# Patient Record
Sex: MALE | Race: WHITE | URBAN - METROPOLITAN AREA
[De-identification: names, ages, dates, MRNs, and addresses within clinical notes are randomized per-mention and may not be internally consistent; named-entity substitution may affect disease eponyms.]

---

## 2023-05-22 ENCOUNTER — OFFICE VISIT (OUTPATIENT)
Dept: NEUROLOGY | Age: 68
End: 2023-05-22

## 2023-05-22 ENCOUNTER — OFFICE VISIT (OUTPATIENT)
Age: 68
End: 2023-05-22
Payer: MEDICARE

## 2023-05-22 VITALS
DIASTOLIC BLOOD PRESSURE: 70 MMHG | BODY MASS INDEX: 28.14 KG/M2 | HEART RATE: 83 BPM | WEIGHT: 201 LBS | OXYGEN SATURATION: 99 % | HEIGHT: 71 IN | TEMPERATURE: 97.1 F | SYSTOLIC BLOOD PRESSURE: 120 MMHG

## 2023-05-22 DIAGNOSIS — R41.89 COGNITIVE IMPAIRMENT: Primary | ICD-10-CM

## 2023-05-22 DIAGNOSIS — R68.89 OTHER GENERAL SYMPTOMS AND SIGNS: ICD-10-CM

## 2023-05-22 DIAGNOSIS — I63.9 CEREBROVASCULAR ACCIDENT (CVA), UNSPECIFIED MECHANISM (HCC): ICD-10-CM

## 2023-05-22 DIAGNOSIS — R41.89 COGNITIVE IMPAIRMENT: ICD-10-CM

## 2023-05-22 PROCEDURE — 99205 OFFICE O/P NEW HI 60 MIN: CPT | Performed by: PSYCHIATRY & NEUROLOGY

## 2023-05-22 PROCEDURE — G8419 CALC BMI OUT NRM PARAM NOF/U: HCPCS | Performed by: PSYCHIATRY & NEUROLOGY

## 2023-05-22 PROCEDURE — G8428 CUR MEDS NOT DOCUMENT: HCPCS | Performed by: PSYCHIATRY & NEUROLOGY

## 2023-05-22 RX ORDER — TAMSULOSIN HYDROCHLORIDE 0.4 MG/1
CAPSULE ORAL
COMMUNITY
Start: 2022-07-01

## 2023-05-22 RX ORDER — DEXAMETHASONE SODIUM PHOSPHATE 1 MG/ML
SOLUTION/ DROPS OPHTHALMIC
COMMUNITY
Start: 2023-04-25

## 2023-05-22 RX ORDER — TOPIRAMATE 25 MG/1
25 TABLET ORAL DAILY
COMMUNITY
Start: 2023-05-15

## 2023-05-22 RX ORDER — CETIRIZINE HYDROCHLORIDE 10 MG/1
TABLET ORAL
COMMUNITY
Start: 2023-03-27

## 2023-05-22 RX ORDER — LISINOPRIL 10 MG/1
TABLET ORAL
COMMUNITY
Start: 2023-05-11

## 2023-05-22 RX ORDER — ATORVASTATIN CALCIUM 40 MG/1
40 TABLET, FILM COATED ORAL DAILY
COMMUNITY
Start: 2023-05-18

## 2023-05-22 RX ORDER — UMECLIDINIUM BROMIDE AND VILANTEROL TRIFENATATE 62.5; 25 UG/1; UG/1
POWDER RESPIRATORY (INHALATION)
COMMUNITY

## 2023-05-22 RX ORDER — ASPIRIN 81 MG/1
TABLET ORAL EVERY 24 HOURS
COMMUNITY

## 2023-05-22 RX ORDER — AMLODIPINE BESYLATE 5 MG/1
TABLET ORAL
COMMUNITY

## 2023-05-22 RX ORDER — FLUTICASONE PROPIONATE 50 MCG
SPRAY, SUSPENSION (ML) NASAL
COMMUNITY

## 2023-05-22 RX ORDER — PREDNISOLONE ACETATE 10 MG/ML
1 SUSPENSION/ DROPS OPHTHALMIC DAILY
COMMUNITY

## 2023-05-22 RX ORDER — OMEPRAZOLE 20 MG/1
CAPSULE, DELAYED RELEASE ORAL
COMMUNITY

## 2023-05-22 NOTE — PROGRESS NOTES
NEUROLOGY NEW PATIENT CONSULTATION      5/22/2023    RE: Los Florentino    REFERRED BY:  GENARO Orozco NP    CHIEF COMPLAINT:  This is Los Nephew is a 69 y.o. male who had concerns including Neurologic Problem. HPI:     In 2012, patient had a basal ganglia stroke seen at [facility] neurology. (Memory and aging care clinic). Not placed on meds. Since then, patient noted to have cognitive impairment. He had neuropsychological testing done in 12/2012 at [facility] with results demonstrating intact learning and memory, language, complex attention, and visuopatial abilities. His deficits were felt to be most consistent with his prior stroke, particularly his variable performance in attention and executive functioning as well as his reported amotivation, apathy, and blunted affect. He reports that his symptoms improved somewhat on initiating treatment with Adderall but this effect has diminished. Patient continues to be withdrawn. Has problem expressing himself. (-) hallucinations  (-) depression  Sleeping okay    Takes ASA with no new strokes. Review of Systems  (-) fever  (-) rash  All other systems reviewed and are negative    PMH  No past medical history on file. Stroke 2012    Social Hx  Social History     Socioeconomic History    Marital status:    (+) smoking - 1 PPD  (-) alcohol    Family Hx  No family history on file.   Mother - dementia    ALLERGIES  No Known Allergies    CURRENT MEDS  Current Outpatient Medications   Medication Sig Dispense Refill    topiramate (TOPAMAX) 25 MG tablet Take 1 tablet by mouth daily      tamsulosin (FLOMAX) 0.4 MG capsule as directed Orally at bedtime for 90 days      amLODIPine (NORVASC) 5 MG tablet TAKE 1 TABLET BY MOUTH EVERY DAY FOR 90 DAYS for 90 days      Ascorbic Acid 500 MG CHEW Take 500 mg by mouth daily      prednisoLONE acetate (PRED FORTE) 1 % ophthalmic suspension 1 drop daily      omeprazole (PRILOSEC) 20 MG delayed release capsule TAKE ONE CAPSULE

## 2023-05-24 ENCOUNTER — TELEPHONE (OUTPATIENT)
Age: 68
End: 2023-05-24

## 2023-05-25 LAB
ANA SER QL: POSITIVE
CENTROMERE B AB SER-ACNC: <0.2 AI (ref 0–0.9)
CHROMATIN AB SERPL-ACNC: <0.2 AI (ref 0–0.9)
DSDNA AB SER-ACNC: <1 IU/ML (ref 0–9)
ENA JO1 AB SER-ACNC: <0.2 AI (ref 0–0.9)
ENA RNP AB SER-ACNC: 4.4 AI (ref 0–0.9)
ENA SCL70 AB SER-ACNC: <0.2 AI (ref 0–0.9)
ENA SM AB SER-ACNC: <0.2 AI (ref 0–0.9)
ENA SS-A AB SER-ACNC: <0.2 AI (ref 0–0.9)
ENA SS-B AB SER-ACNC: <0.2 AI (ref 0–0.9)
ERYTHROCYTE [SEDIMENTATION RATE] IN BLOOD BY WESTERGREN METHOD: 2 MM/HR (ref 0–30)
FOLATE SERPL-MCNC: >20 NG/ML
Lab: ABNORMAL
TSH SERPL DL<=0.005 MIU/L-ACNC: 0.79 UIU/ML (ref 0.45–4.5)
VIT B12 SERPL-MCNC: 588 PG/ML (ref 232–1245)

## 2023-05-26 ENCOUNTER — TELEPHONE (OUTPATIENT)
Age: 68
End: 2023-05-26

## 2023-05-26 NOTE — TELEPHONE ENCOUNTER
I called the pt and spoke to his wife mrs mar, and I informed her that we received a referral for her  to come into our clinic to see our doctor, but I had to inform her that our doctor's panel is closed and that he is not taking on any new pt. I advised her to let her  reach our to the referring doctor to find him another doctor that he can see.  5/26/23

## 2023-05-30 ENCOUNTER — TELEPHONE (OUTPATIENT)
Age: 68
End: 2023-05-30

## 2023-05-30 NOTE — TELEPHONE ENCOUNTER
Kev Petty. Hipaa verified. Inform her that the pt  RED and RNP labs are postive and that Dr. Hamilton Morgan referred pt to Rheumatology and that they will contact pt regarding appointment. Yolanda Garza understanding and states that someone has contacted pt regarding appt but does not accept his insurance. Inform Leslye Hamman to have pt to contact his insurance company to see which Rheumatologist is in network. Also inform her that I will mail out the referral to the addressed on file. Yolanda Garza understanding.

## 2023-05-30 NOTE — TELEPHONE ENCOUNTER
----- Message from Dejon Mascorro MD sent at 5/25/2023  1:33 PM EDT -----  Inform patient (+) RED and RNP    P> Refer to rheumatology (referral ordered)      ----- Message -----  From: Meenu Chavarria Incoming Amb Ref Lab Orders To Denisha Goods: 5/25/2023   6:38 AM EDT  To: Dejon Mascorro MD

## 2023-06-01 ENCOUNTER — HOSPITAL ENCOUNTER (OUTPATIENT)
Age: 68
Discharge: HOME | End: 2023-06-01
Payer: MEDICARE

## 2023-06-01 DIAGNOSIS — R41.89 COGNITIVE IMPAIRMENT: ICD-10-CM

## 2023-06-01 PROCEDURE — 70551 MRI BRAIN STEM W/O DYE: CPT

## 2023-07-03 ENCOUNTER — OFFICE VISIT (OUTPATIENT)
Age: 68
End: 2023-07-03
Payer: MEDICARE

## 2023-07-03 DIAGNOSIS — R41.3 SHORT-TERM MEMORY LOSS: ICD-10-CM

## 2023-07-03 DIAGNOSIS — Z86.73 HISTORY OF ARTERIAL ISCHEMIC STROKE: ICD-10-CM

## 2023-07-03 DIAGNOSIS — F32.1 MODERATE MAJOR DEPRESSION (HCC): ICD-10-CM

## 2023-07-03 DIAGNOSIS — G31.84 MILD COGNITIVE IMPAIRMENT: Primary | ICD-10-CM

## 2023-07-03 DIAGNOSIS — R41.89 COGNITIVE DECLINE: ICD-10-CM

## 2023-07-03 DIAGNOSIS — R45.3 APATHY: ICD-10-CM

## 2023-07-03 PROCEDURE — 90785 PSYTX COMPLEX INTERACTIVE: CPT | Performed by: CLINICAL NEUROPSYCHOLOGIST

## 2023-07-03 PROCEDURE — 90791 PSYCH DIAGNOSTIC EVALUATION: CPT | Performed by: CLINICAL NEUROPSYCHOLOGIST

## 2023-07-03 PROCEDURE — 4004F PT TOBACCO SCREEN RCVD TLK: CPT | Performed by: CLINICAL NEUROPSYCHOLOGIST

## 2023-07-03 PROCEDURE — 1123F ACP DISCUSS/DSCN MKR DOCD: CPT | Performed by: CLINICAL NEUROPSYCHOLOGIST

## 2023-07-03 NOTE — PROGRESS NOTES
[address] [facility]  [address] [address] [facility], [address] [place] [address]   [phone] Office   [phone] Fax      Neuropsychology    Initial Diagnostic Interview Note      Referral:  Talmage Krabbe, APRN - NP    Veronica Arroyo is a 69 y.o. right handed   male who was accompanied by his spouse to the initial clinical interview on 7/3/23 . Please refer to his medical records for details pertaining to his history. At the start of the appointment, I reviewed the patient's [facility] Epic Chart (including Media scanned in from previous providers) for the active Problem List, all pertinent Past Medical Hx, medications, recent radiologic and laboratory findings. In addition, I reviewed pt's documented Immunization Record and Encounter History. In 2012, patient had a basal ganglia stroke seen at [facility] neurology. (Memory and aging care clinic). Not placed on meds. Since then, patient noted to have progressive neurocognitive decline. When he did testing in 2012 at [facility] they found normal range learning, memory, language, attention, visuospatial skills. There was apathy and blunted affect. He was put on adderall. Simple attention problems had been found. There is progressive decline. He has word finding problems. No hallucinations/psychosis. No depression. Sleep and appetite are status quo. Takes ASA with no new stroke, meningitis/encephalitis, ILENE Fever, Lupus, Lyme, TBI, sz. Since then, there have been progressive changes. Spouse provides a lot of the history, as patient is confused today and struggles with repetition. After stroke he was out of work 10 months. Then went back to work and did well. He then retired in 2014 (not because of memory issues). He has been getting worse. He doesn't want to go anywhere. Do anything. Hard time coming up with words and such. He drives, but not far.   He can take his

## 2023-07-14 ENCOUNTER — TELEPHONE (OUTPATIENT)
Age: 68
End: 2023-07-14

## 2023-07-17 ENCOUNTER — PROCEDURE VISIT (OUTPATIENT)
Age: 68
End: 2023-07-17
Payer: MEDICARE

## 2023-07-17 DIAGNOSIS — F32.1 MODERATE MAJOR DEPRESSION (HCC): ICD-10-CM

## 2023-07-17 DIAGNOSIS — I69.398 DEPRESSION DUE TO OLD STROKE: ICD-10-CM

## 2023-07-17 DIAGNOSIS — F41.1 GENERALIZED ANXIETY DISORDER: ICD-10-CM

## 2023-07-17 DIAGNOSIS — F01.A0 MILD VASCULAR DEMENTIA WITHOUT BEHAVIORAL DISTURBANCE, PSYCHOTIC DISTURBANCE, MOOD DISTURBANCE, OR ANXIETY (HCC): Primary | ICD-10-CM

## 2023-07-17 DIAGNOSIS — F06.31 DEPRESSION DUE TO OLD STROKE: ICD-10-CM

## 2023-07-17 PROCEDURE — 96136 PSYCL/NRPSYC TST PHY/QHP 1ST: CPT | Performed by: CLINICAL NEUROPSYCHOLOGIST

## 2023-07-17 PROCEDURE — 96137 PSYCL/NRPSYC TST PHY/QHP EA: CPT | Performed by: CLINICAL NEUROPSYCHOLOGIST

## 2023-07-17 PROCEDURE — 96132 NRPSYC TST EVAL PHYS/QHP 1ST: CPT | Performed by: CLINICAL NEUROPSYCHOLOGIST

## 2023-07-17 PROCEDURE — 96133 NRPSYC TST EVAL PHYS/QHP EA: CPT | Performed by: CLINICAL NEUROPSYCHOLOGIST

## 2023-07-17 PROCEDURE — 96138 PSYCL/NRPSYC TECH 1ST: CPT | Performed by: CLINICAL NEUROPSYCHOLOGIST

## 2023-07-17 PROCEDURE — 96139 PSYCL/NRPSYC TST TECH EA: CPT | Performed by: CLINICAL NEUROPSYCHOLOGIST

## 2023-07-20 NOTE — PROGRESS NOTES
[address] [facility]  [address] [address] [facility], [address] [place] [address]   [phone] Office   [phone] Fax      Neuropsychological Evaluation Report    Referral:  GENARO Roland NP    Mary Beaver is a 69 y.o. right handed   male who was accompanied by his spouse to the initial clinical interview on 7/3/23 . Please refer to his medical records for details pertaining to his history. At the start of the appointment, I reviewed the patient's [facility] Epic Chart (including Media scanned in from previous providers) for the active Problem List, all pertinent Past Medical Hx, medications, recent radiologic and laboratory findings. In addition, I reviewed pt's documented Immunization Record and Encounter History. In 2012, patient had a basal ganglia stroke seen at [facility] neurology. (Memory and aging care clinic). Not placed on meds. Since then, patient noted to have progressive neurocognitive decline. When he did testing in 2012 at [facility] they found normal range learning, memory, language, attention, visuospatial skills. There was apathy and blunted affect. He was put on adderall. Simple attention problems had been found. There is progressive decline. He has word finding problems. No hallucinations/psychosis. No depression. Sleep and appetite are status quo. Takes ASA with no new stroke, meningitis/encephalitis, ILENE Fever, Lupus, Lyme, TBI, sz. Since then, there have been progressive changes. Spouse provides a lot of the history, as patient is confused today and struggles with repetition. After stroke he was out of work 10 months. Then went back to work and did well. He then retired in 2014 (not because of memory issues). He has been getting worse. He doesn't want to go anywhere. Do anything. Hard time coming up with words and such. He drives, but not far. He can take his medications.   She

## 2023-10-10 ENCOUNTER — OFFICE VISIT (OUTPATIENT)
Age: 68
End: 2023-10-10
Payer: MEDICARE

## 2023-10-10 DIAGNOSIS — I69.398 DEPRESSION DUE TO OLD STROKE: ICD-10-CM

## 2023-10-10 DIAGNOSIS — R41.3 SHORT-TERM MEMORY LOSS: ICD-10-CM

## 2023-10-10 DIAGNOSIS — F41.1 GENERALIZED ANXIETY DISORDER: ICD-10-CM

## 2023-10-10 DIAGNOSIS — F06.31 DEPRESSION DUE TO OLD STROKE: ICD-10-CM

## 2023-10-10 DIAGNOSIS — F01.A0 MILD VASCULAR DEMENTIA WITHOUT BEHAVIORAL DISTURBANCE, PSYCHOTIC DISTURBANCE, MOOD DISTURBANCE, OR ANXIETY (HCC): Primary | ICD-10-CM

## 2023-10-10 DIAGNOSIS — F32.1 MODERATE MAJOR DEPRESSION (HCC): ICD-10-CM

## 2023-10-10 DIAGNOSIS — R41.89 COGNITIVE DECLINE: ICD-10-CM

## 2023-10-10 DIAGNOSIS — Z86.73 HISTORY OF ARTERIAL ISCHEMIC STROKE: ICD-10-CM

## 2023-10-10 PROCEDURE — 90785 PSYTX COMPLEX INTERACTIVE: CPT | Performed by: CLINICAL NEUROPSYCHOLOGIST

## 2023-10-10 PROCEDURE — 4004F PT TOBACCO SCREEN RCVD TLK: CPT | Performed by: CLINICAL NEUROPSYCHOLOGIST

## 2023-10-10 PROCEDURE — 1123F ACP DISCUSS/DSCN MKR DOCD: CPT | Performed by: CLINICAL NEUROPSYCHOLOGIST

## 2023-10-10 PROCEDURE — 90832 PSYTX W PT 30 MINUTES: CPT | Performed by: CLINICAL NEUROPSYCHOLOGIST

## 2023-10-10 NOTE — PROGRESS NOTES
Prior to seeing the patient I reviewed the records, including the previously completed report, the records in [place], and any updated visits from other providers since I saw the patient last.      Today, I engaged in a psychoeducational and supportive and cognitive/behavioral psychotherapy session with the patient. I provided psychotherapy in the form of psychoeducation and support with respect to the results of the recent Neuropsychological Evaluation, including discussing individual tests as well as patient's areas of neurocognitive strength versus weakness. We discussed, in detail, the following: This patient generated a a mixed normal/abnormal range Neuropsychological Evaluation with respect to neurocognitive functioning. In this regard, he is showing problems with sustained attention (moderate to severe) verbal fluency (mild), nondominant handed fine motor dexterity (mild to moderate) and processing speed (borderline). Otherwise, his performances across all other neurocognitive veins assessed are normal.  From an emotional standpoint, the patient was highly defensive in his response style on personality testing but there is clear evidence of significant depression and anxiety symptoms. After her stroke, the patient had neuropsych testing which showed attention deficit issues. Here, we are continuing to see marked attention deficit problems but also seeing some verbal fluency and motor skills issues. This suggests a decline in functioning over time which would be consistent with a vascular type dementia consideration. At the same time, though, his memory scores are normal, which is reassuring. A contributing factor here is significant depression and anxiety, some of which appears to be stroke related. These issues serve to further enhance underlying neurocognitive deficits.                  In addition to continued medical care, recommendations include psychiatric treatment

## 2023-11-13 NOTE — PROGRESS NOTES
Neurology Progress Note    Patient ID:  Elma Worrell  [id number]  58 y.o.  1955      Subjective:   History:  Elma Worrell is a 69 y.o. male smoker who in 2012, patient had a basal ganglia stroke seen at [facility] neurology. (Memory and aging care clinic). Not placed on meds. Since then, patient noted to have cognitive impairment. He had neuropsychological testing done in 12/2012 at [facility] with results demonstrating intact learning and memory, language, complex attention, and visuopatial abilities. His deficits were felt to be most consistent with his prior stroke, particularly his variable performance in attention and executive functioning as well as his reported amotivation, apathy, and blunted affect. He reports that his symptoms improved somewhat on initiating treatment with Adderall but this effect has diminished. Patient's RED and RNP were both positive. [facility] \"[facility] was okay. \"          Neuropsychological testing (7/17/23)  This patient generated a a mixed normal/abnormal range Neuropsychological Evaluation with respect to neurocognitive functioning. In this regard, he is showing problems with sustained attention (moderate to severe) verbal fluency (mild), nondominant handed fine motor dexterity (mild to moderate) and processing speed (borderline). Otherwise, his performances across all other neurocognitive veins assessed are normal.  From an emotional standpoint, the patient was highly defensive in his response style on personality testing but there is clear evidence of significant depression and anxiety symptoms. After her stroke, the patient had neuropsych testing which showed attention deficit issues. Here, we are continuing to see marked attention deficit problems but also seeing some verbal fluency and motor skills issues.   This suggests a decline in functioning over time which would be consistent with a vascular type dementia

## 2023-11-20 ENCOUNTER — OFFICE VISIT (OUTPATIENT)
Age: 68
End: 2023-11-20
Payer: MEDICARE

## 2023-11-20 VITALS
BODY MASS INDEX: 27.89 KG/M2 | DIASTOLIC BLOOD PRESSURE: 70 MMHG | TEMPERATURE: 97 F | HEART RATE: 92 BPM | SYSTOLIC BLOOD PRESSURE: 120 MMHG | HEIGHT: 71 IN | OXYGEN SATURATION: 97 %

## 2023-11-20 DIAGNOSIS — R41.89 COGNITIVE IMPAIRMENT: Primary | ICD-10-CM

## 2023-11-20 DIAGNOSIS — F01.A0 MILD VASCULAR DEMENTIA, UNSPECIFIED WHETHER BEHAVIORAL, PSYCHOTIC, OR MOOD DISTURBANCE OR ANXIETY (HCC): ICD-10-CM

## 2023-11-20 PROCEDURE — G8484 FLU IMMUNIZE NO ADMIN: HCPCS | Performed by: PSYCHIATRY & NEUROLOGY

## 2023-11-20 PROCEDURE — 3017F COLORECTAL CA SCREEN DOC REV: CPT | Performed by: PSYCHIATRY & NEUROLOGY

## 2023-11-20 PROCEDURE — G8427 DOCREV CUR MEDS BY ELIG CLIN: HCPCS | Performed by: PSYCHIATRY & NEUROLOGY

## 2023-11-20 PROCEDURE — G8419 CALC BMI OUT NRM PARAM NOF/U: HCPCS | Performed by: PSYCHIATRY & NEUROLOGY

## 2023-11-20 PROCEDURE — 99215 OFFICE O/P EST HI 40 MIN: CPT | Performed by: PSYCHIATRY & NEUROLOGY

## 2023-11-20 PROCEDURE — 1123F ACP DISCUSS/DSCN MKR DOCD: CPT | Performed by: PSYCHIATRY & NEUROLOGY

## 2023-11-20 PROCEDURE — 4004F PT TOBACCO SCREEN RCVD TLK: CPT | Performed by: PSYCHIATRY & NEUROLOGY

## 2023-11-20 RX ORDER — DONEPEZIL HYDROCHLORIDE 5 MG/1
5 TABLET, FILM COATED ORAL DAILY
Qty: 30 TABLET | Refills: 5 | Status: SHIPPED | OUTPATIENT
Start: 2023-11-20

## 2023-12-01 ENCOUNTER — HOSPITAL ENCOUNTER (OUTPATIENT)
Facility: HOSPITAL | Age: 68
End: 2023-12-01
Attending: PSYCHIATRY & NEUROLOGY
Payer: MEDICARE

## 2023-12-01 DIAGNOSIS — R41.89 COGNITIVE IMPAIRMENT: ICD-10-CM

## 2023-12-01 LAB — CREAT BLD-MCNC: 1.6 MG/DL (ref 0.6–1.3)

## 2023-12-01 PROCEDURE — 6360000004 HC RX CONTRAST MEDICATION: Performed by: PSYCHIATRY & NEUROLOGY

## 2023-12-01 PROCEDURE — 82565 ASSAY OF CREATININE: CPT

## 2023-12-01 PROCEDURE — 70496 CT ANGIOGRAPHY HEAD: CPT

## 2023-12-01 RX ADMIN — IOPAMIDOL 100 ML: 755 INJECTION, SOLUTION INTRAVENOUS at 15:01

## 2024-02-23 NOTE — PROGRESS NOTES
Neurology Progress Note    Patient ID:  Ketan Salas  [id number]  68 y.o.  1955      Subjective:   History:  Ketan Salas is a male smoker who in 2012, patient had a basal ganglia stroke seen at [facility] neurology. (Memory and aging care clinic). Not placed on meds. Since then, patient noted to have cognitive impairment. He had neuropsychological testing done in 12/2012 at [facility] with results demonstrating intact learning and memory, language, complex attention, and visuopatial abilities. His deficits were felt to be most consistent with his prior stroke, particularly his variable performance in attention and executive functioning as well as his reported amotivation, apathy, and blunted affect. He reports that his symptoms improved somewhat on initiating treatment with Adderall but this effect has diminished. Neuropsychological testing (7/17/23)Dementia -Mild, Vascular (decline since stroke, primarily attention problems); Depression with Anxiety - At Least Moderate, Post Stroke, With Apathy and Low Motivation Patient's RED and RNP were both positive. Patient saw a rheumatologist at [facility] but said \"everything was okay.\"    Patient was started on Aricept 5 mg QHS with no clear benefit.    Having a lot of attention issue. Not on Adderall which worked in the past.    On ASA 81 with no new event    Still smokes more than 1/2 PPD.           ROS:  Per HPI-  Otherwise the remainder of ROS was negative    Social Hx  Social History     Socioeconomic History    Marital status:        Meds:  Current Outpatient Medications on File Prior to Visit   Medication Sig Dispense Refill    donepezil (ARICEPT) 5 MG tablet Take 1 tablet by mouth daily 30 tablet 5    topiramate (TOPAMAX) 25 MG tablet Take 1 tablet by mouth daily      tamsulosin (FLOMAX) 0.4 MG capsule as directed Orally at bedtime for 90 days      Ascorbic Acid 500 MG CHEW Take 500 mg by mouth daily      prednisoLONE acetate (PRED FORTE) 1 % ophthalmic suspension 1 drop

## 2024-02-26 ENCOUNTER — OFFICE VISIT (OUTPATIENT)
Age: 69
End: 2024-02-26
Payer: MEDICARE

## 2024-02-26 VITALS
DIASTOLIC BLOOD PRESSURE: 70 MMHG | HEIGHT: 71 IN | BODY MASS INDEX: 27.89 KG/M2 | TEMPERATURE: 97 F | SYSTOLIC BLOOD PRESSURE: 120 MMHG | OXYGEN SATURATION: 97 % | HEART RATE: 82 BPM

## 2024-02-26 DIAGNOSIS — I69.30 SEQUELA, POST-STROKE: ICD-10-CM

## 2024-02-26 DIAGNOSIS — F90.9 ATTENTION DEFICIT HYPERACTIVITY DISORDER (ADHD), UNSPECIFIED ADHD TYPE: Primary | ICD-10-CM

## 2024-02-26 DIAGNOSIS — R41.840 ATTENTION DEFICIT: ICD-10-CM

## 2024-02-26 PROCEDURE — 99215 OFFICE O/P EST HI 40 MIN: CPT | Performed by: PSYCHIATRY & NEUROLOGY

## 2024-02-26 PROCEDURE — 1123F ACP DISCUSS/DSCN MKR DOCD: CPT | Performed by: PSYCHIATRY & NEUROLOGY

## 2024-02-26 PROCEDURE — G8484 FLU IMMUNIZE NO ADMIN: HCPCS | Performed by: PSYCHIATRY & NEUROLOGY

## 2024-02-26 PROCEDURE — 4004F PT TOBACCO SCREEN RCVD TLK: CPT | Performed by: PSYCHIATRY & NEUROLOGY

## 2024-02-26 PROCEDURE — 3017F COLORECTAL CA SCREEN DOC REV: CPT | Performed by: PSYCHIATRY & NEUROLOGY

## 2024-02-26 PROCEDURE — G8419 CALC BMI OUT NRM PARAM NOF/U: HCPCS | Performed by: PSYCHIATRY & NEUROLOGY

## 2024-02-26 PROCEDURE — G8428 CUR MEDS NOT DOCUMENT: HCPCS | Performed by: PSYCHIATRY & NEUROLOGY

## 2024-02-26 RX ORDER — DEXTROAMPHETAMINE SACCHARATE, AMPHETAMINE ASPARTATE, DEXTROAMPHETAMINE SULFATE AND AMPHETAMINE SULFATE 2.5; 2.5; 2.5; 2.5 MG/1; MG/1; MG/1; MG/1
10 TABLET ORAL DAILY
Qty: 30 TABLET | Refills: 0 | Status: SHIPPED | OUTPATIENT
Start: 2024-02-26 | End: 2024-02-27 | Stop reason: SDUPTHER

## 2024-02-27 ENCOUNTER — TELEPHONE (OUTPATIENT)
Age: 69
End: 2024-02-27

## 2024-02-27 DIAGNOSIS — R41.840 ATTENTION DEFICIT: ICD-10-CM

## 2024-02-27 NOTE — TELEPHONE ENCOUNTER
Called pt. No answer left message on VM stating that Dr. Fish received a alternative requested from [facility] pharmacy regarding adderall 10mg to send to another pharmacy. Pt does not have another pharmacy on file and Dr. Fish would like to know what pharmacy pt would like this medication sent to.

## 2024-02-28 RX ORDER — DEXTROAMPHETAMINE SACCHARATE, AMPHETAMINE ASPARTATE, DEXTROAMPHETAMINE SULFATE AND AMPHETAMINE SULFATE 2.5; 2.5; 2.5; 2.5 MG/1; MG/1; MG/1; MG/1
10 TABLET ORAL DAILY
Qty: 30 TABLET | Refills: 0 | Status: SHIPPED | OUTPATIENT
Start: 2024-02-28 | End: 2024-03-29

## 2024-03-27 DIAGNOSIS — R41.840 ATTENTION DEFICIT: ICD-10-CM

## 2024-04-03 RX ORDER — DEXTROAMPHETAMINE SACCHARATE, AMPHETAMINE ASPARTATE, DEXTROAMPHETAMINE SULFATE AND AMPHETAMINE SULFATE 2.5; 2.5; 2.5; 2.5 MG/1; MG/1; MG/1; MG/1
10 TABLET ORAL DAILY
Qty: 30 TABLET | Refills: 0 | Status: SHIPPED | OUTPATIENT
Start: 2024-04-03 | End: 2024-05-03

## 2024-05-07 DIAGNOSIS — R41.840 ATTENTION DEFICIT: ICD-10-CM

## 2024-05-08 RX ORDER — DEXTROAMPHETAMINE SACCHARATE, AMPHETAMINE ASPARTATE, DEXTROAMPHETAMINE SULFATE AND AMPHETAMINE SULFATE 2.5; 2.5; 2.5; 2.5 MG/1; MG/1; MG/1; MG/1
10 TABLET ORAL DAILY
Qty: 30 TABLET | Refills: 0 | Status: SHIPPED | OUTPATIENT
Start: 2024-05-08 | End: 2024-06-07

## 2024-05-31 NOTE — PROGRESS NOTES
Stress the need to quit smoking. Patient is reluctant        Return in about 3 months (around 9/3/2024).           Dewey Fish MD  Diplomate, American Board of Psychiatry and Neurology  Diplomate, Neuromuscular Medicine  Diplomate, American Board of Electrodiagnostic Medicine

## 2024-06-03 ENCOUNTER — OFFICE VISIT (OUTPATIENT)
Age: 69
End: 2024-06-03
Payer: MEDICARE

## 2024-06-03 VITALS
DIASTOLIC BLOOD PRESSURE: 80 MMHG | WEIGHT: 200 LBS | SYSTOLIC BLOOD PRESSURE: 138 MMHG | HEIGHT: 71 IN | HEART RATE: 80 BPM | OXYGEN SATURATION: 97 % | BODY MASS INDEX: 28 KG/M2 | RESPIRATION RATE: 14 BRPM

## 2024-06-03 DIAGNOSIS — F01.A3 MILD VASCULAR DEMENTIA WITH MOOD DISTURBANCE (HCC): ICD-10-CM

## 2024-06-03 DIAGNOSIS — R41.840 ATTENTION DEFICIT: Primary | ICD-10-CM

## 2024-06-03 PROCEDURE — G8427 DOCREV CUR MEDS BY ELIG CLIN: HCPCS | Performed by: PSYCHIATRY & NEUROLOGY

## 2024-06-03 PROCEDURE — 1123F ACP DISCUSS/DSCN MKR DOCD: CPT | Performed by: PSYCHIATRY & NEUROLOGY

## 2024-06-03 PROCEDURE — 4004F PT TOBACCO SCREEN RCVD TLK: CPT | Performed by: PSYCHIATRY & NEUROLOGY

## 2024-06-03 PROCEDURE — G8419 CALC BMI OUT NRM PARAM NOF/U: HCPCS | Performed by: PSYCHIATRY & NEUROLOGY

## 2024-06-03 PROCEDURE — 3017F COLORECTAL CA SCREEN DOC REV: CPT | Performed by: PSYCHIATRY & NEUROLOGY

## 2024-06-03 PROCEDURE — 99214 OFFICE O/P EST MOD 30 MIN: CPT | Performed by: PSYCHIATRY & NEUROLOGY

## 2024-06-03 RX ORDER — DEXTROAMPHETAMINE SACCHARATE, AMPHETAMINE ASPARTATE, DEXTROAMPHETAMINE SULFATE AND AMPHETAMINE SULFATE 5; 5; 5; 5 MG/1; MG/1; MG/1; MG/1
20 TABLET ORAL DAILY
Qty: 30 TABLET | Refills: 0 | Status: SHIPPED | OUTPATIENT
Start: 2024-06-03 | End: 2024-07-03

## 2024-06-03 RX ORDER — ACYCLOVIR 800 MG/1
800 TABLET ORAL
COMMUNITY
Start: 2023-07-27

## 2024-06-03 RX ORDER — DEXTROAMPHETAMINE SACCHARATE, AMPHETAMINE ASPARTATE, DEXTROAMPHETAMINE SULFATE AND AMPHETAMINE SULFATE 2.5; 2.5; 2.5; 2.5 MG/1; MG/1; MG/1; MG/1
10 TABLET ORAL DAILY
Qty: 30 TABLET | Refills: 2 | Status: CANCELLED | OUTPATIENT
Start: 2024-06-03 | End: 2024-07-03

## 2024-06-26 DIAGNOSIS — R41.840 ATTENTION DEFICIT: ICD-10-CM

## 2024-06-26 RX ORDER — DEXTROAMPHETAMINE SACCHARATE, AMPHETAMINE ASPARTATE, DEXTROAMPHETAMINE SULFATE AND AMPHETAMINE SULFATE 5; 5; 5; 5 MG/1; MG/1; MG/1; MG/1
20 TABLET ORAL DAILY
Qty: 30 TABLET | Refills: 0 | Status: SHIPPED | OUTPATIENT
Start: 2024-06-26 | End: 2024-07-26

## 2024-07-16 ENCOUNTER — TELEPHONE (OUTPATIENT)
Age: 69
End: 2024-07-16

## 2024-07-17 ENCOUNTER — OFFICE VISIT (OUTPATIENT)
Age: 69
End: 2024-07-17
Payer: MEDICARE

## 2024-07-17 DIAGNOSIS — F01.A3 MILD VASCULAR DEMENTIA WITH MOOD DISTURBANCE (HCC): Primary | ICD-10-CM

## 2024-07-17 PROCEDURE — 90791 PSYCH DIAGNOSTIC EVALUATION: CPT | Performed by: CLINICAL NEUROPSYCHOLOGIST

## 2024-07-17 PROCEDURE — 1123F ACP DISCUSS/DSCN MKR DOCD: CPT | Performed by: CLINICAL NEUROPSYCHOLOGIST

## 2024-07-17 PROCEDURE — 4004F PT TOBACCO SCREEN RCVD TLK: CPT | Performed by: CLINICAL NEUROPSYCHOLOGIST

## 2024-07-17 PROCEDURE — 90785 PSYTX COMPLEX INTERACTIVE: CPT | Performed by: CLINICAL NEUROPSYCHOLOGIST

## 2024-07-17 NOTE — PROGRESS NOTES
[facility] NEUROSCIENCE [facility]/EMERGENCY CENTER  NEUROLOGY CLINIC   [address]   [place] [address]   [phone] Office   [phone] Fax      Neuropsychology    Exam # 2    Initial Diagnostic Interview Note      Referral:  Nagi Welch, GENARO Scherer NP    Ketan Salas is a 68 y.o. right handed   male who was accompanied to the initial clinical interview on 7/17/2024 .  Please refer to his medical records for details pertaining to his history.   At the start of the appointment, I reviewed the patient's [facility] Epic Chart (including Media scanned in from previous providers) for the active Problem List, all pertinent Past Medical Hx, medications, recent radiologic and laboratory findings.  In addition, I reviewed pt's documented Immunization Record and Encounter History.     When I saw him last:    Ketan Salas is a 67 y.o. right handed   male who was accompanied by his spouse to the initial clinical interview on 7/3/23 .  Please refer to his medical records for details pertaining to his history.   At the start of the appointment, I reviewed the patient's [facility] Epic Chart (including Media scanned in from previous providers) for the active Problem List, all pertinent Past Medical Hx, medications, recent radiologic and laboratory findings.  In addition, I reviewed pt's documented Immunization Record and Encounter History.     In 2012, patient had a basal ganglia stroke seen at [facility] neurology. (Memory and aging care clinic). Not placed on meds. Since then, patient noted to have progressive neurocognitive decline. When he did testing in 2012 at [facility] they found normal range learning, memory, language, attention, visuospatial skills.  There was apathy and blunted affect.  He was put on adderall.  Simple attention problems had been found. There is progressive decline. He has word finding problems.  No hallucinations/psychosis. No

## 2024-07-23 ENCOUNTER — PROCEDURE VISIT (OUTPATIENT)
Age: 69
End: 2024-07-23

## 2024-07-23 DIAGNOSIS — F01.A3 MILD VASCULAR DEMENTIA WITH MOOD DISTURBANCE (HCC): Primary | ICD-10-CM

## 2024-07-23 DIAGNOSIS — F41.9 ANXIETY AND DEPRESSION: ICD-10-CM

## 2024-07-23 DIAGNOSIS — F32.A ANXIETY AND DEPRESSION: ICD-10-CM

## 2024-07-23 DIAGNOSIS — R41.840 ATTENTION DEFICIT: ICD-10-CM

## 2024-07-27 NOTE — PROGRESS NOTES
significant depression and anxiety symptoms.                   After the stroke, the patient had neuropsych testing which showed attention deficit issues.  Over time, a progressive decline in sustained attention is seem.  Memory scores remain above normal, and his executive functioning abilities are normal.  He has made poor financial decisions of late, but is of sound mind/capacity to have made bad decisions, and he retains capacity at this time.  I suggest ongoing reviews of his medication regimen regimen for attention, depression/affect, hopeful for him.  He retains capacity only issue that is worsening over time is his attention.  We do not have a baseline and updated data him.  Follow up yearly or as needed.  Clinical correlation is, of course, indicated.                I will discuss these findings with the patient and family when they follow up with me in the near future.  A follow up Neuropsychological Evaluation is indicated on a prn basis.       DIAGNOSES: Dementia -Mild, Vascular (decline since stroke, primarily attention problems, slow declining)                                 Depression with Anxiety - At Least Moderate, Post Stroke, With Apathy and Low Motivation     The above information is based upon information currently available to me.  If there is any additional information of which I am currently unaware, I would be more than happy to review it upon having it made available to me.  Thank you for the opportunity to see this interesting individual.     Sincerely,       Anita Comer PsyD, EdS      Attachments:  IQ Test Results (In Media Section Of This EMR)    Cc: Nagi Welch APRN - LIVE    Time Documentation:      96138 x 1  96139 x 5 Test Administration/Data Gathering By Technician: (3 hours). 96138 x 1 (first 30 minutes), 96138 x 5 (each additional 30 minutes)    96132 x 1  96133 x 2 Testing Evaluation Services by Neuropsychologist (2 hour, 45 minutes) 96132 x 1 (first hour), 96133 x 2

## 2024-07-31 DIAGNOSIS — R41.840 ATTENTION DEFICIT: ICD-10-CM

## 2024-08-01 RX ORDER — DEXTROAMPHETAMINE SACCHARATE, AMPHETAMINE ASPARTATE, DEXTROAMPHETAMINE SULFATE AND AMPHETAMINE SULFATE 5; 5; 5; 5 MG/1; MG/1; MG/1; MG/1
20 TABLET ORAL DAILY
Qty: 30 TABLET | Refills: 0 | Status: SHIPPED | OUTPATIENT
Start: 2024-08-01 | End: 2024-08-31

## 2024-09-05 DIAGNOSIS — R41.840 ATTENTION DEFICIT: ICD-10-CM

## 2024-09-06 RX ORDER — DEXTROAMPHETAMINE SACCHARATE, AMPHETAMINE ASPARTATE, DEXTROAMPHETAMINE SULFATE AND AMPHETAMINE SULFATE 5; 5; 5; 5 MG/1; MG/1; MG/1; MG/1
20 TABLET ORAL DAILY
Qty: 30 TABLET | Refills: 0 | Status: SHIPPED | OUTPATIENT
Start: 2024-09-06 | End: 2024-10-06

## 2024-09-20 ENCOUNTER — OFFICE VISIT (OUTPATIENT)
Age: 69
End: 2024-09-20
Payer: MEDICARE

## 2024-09-20 VITALS
BODY MASS INDEX: 27.89 KG/M2 | DIASTOLIC BLOOD PRESSURE: 70 MMHG | TEMPERATURE: 95.9 F | OXYGEN SATURATION: 98 % | HEIGHT: 71 IN | SYSTOLIC BLOOD PRESSURE: 130 MMHG | HEART RATE: 85 BPM

## 2024-09-20 DIAGNOSIS — R41.840 ATTENTION DEFICIT: Primary | ICD-10-CM

## 2024-09-20 DIAGNOSIS — F01.A3 MILD VASCULAR DEMENTIA WITH MOOD DISTURBANCE (HCC): ICD-10-CM

## 2024-09-20 PROCEDURE — 99215 OFFICE O/P EST HI 40 MIN: CPT | Performed by: PSYCHIATRY & NEUROLOGY

## 2024-09-20 PROCEDURE — 3017F COLORECTAL CA SCREEN DOC REV: CPT | Performed by: PSYCHIATRY & NEUROLOGY

## 2024-09-20 PROCEDURE — 4004F PT TOBACCO SCREEN RCVD TLK: CPT | Performed by: PSYCHIATRY & NEUROLOGY

## 2024-09-20 PROCEDURE — G8428 CUR MEDS NOT DOCUMENT: HCPCS | Performed by: PSYCHIATRY & NEUROLOGY

## 2024-09-20 PROCEDURE — 1123F ACP DISCUSS/DSCN MKR DOCD: CPT | Performed by: PSYCHIATRY & NEUROLOGY

## 2024-09-20 PROCEDURE — G8419 CALC BMI OUT NRM PARAM NOF/U: HCPCS | Performed by: PSYCHIATRY & NEUROLOGY

## 2024-09-24 ENCOUNTER — OFFICE VISIT (OUTPATIENT)
Age: 69
End: 2024-09-24
Payer: MEDICARE

## 2024-09-24 DIAGNOSIS — F32.1 MODERATE MAJOR DEPRESSION (HCC): ICD-10-CM

## 2024-09-24 DIAGNOSIS — F41.1 GENERALIZED ANXIETY DISORDER: ICD-10-CM

## 2024-09-24 DIAGNOSIS — F01.A3 MILD VASCULAR DEMENTIA WITH MOOD DISTURBANCE (HCC): Primary | ICD-10-CM

## 2024-09-24 PROCEDURE — 96132 NRPSYC TST EVAL PHYS/QHP 1ST: CPT | Performed by: CLINICAL NEUROPSYCHOLOGIST

## 2024-10-09 DIAGNOSIS — R41.840 ATTENTION DEFICIT: ICD-10-CM

## 2024-10-10 RX ORDER — DEXTROAMPHETAMINE SACCHARATE, AMPHETAMINE ASPARTATE, DEXTROAMPHETAMINE SULFATE AND AMPHETAMINE SULFATE 5; 5; 5; 5 MG/1; MG/1; MG/1; MG/1
20 TABLET ORAL DAILY
Qty: 30 TABLET | Refills: 0 | Status: SHIPPED | OUTPATIENT
Start: 2024-10-10 | End: 2024-11-09

## 2024-11-07 DIAGNOSIS — R41.840 ATTENTION DEFICIT: ICD-10-CM

## 2024-11-08 RX ORDER — DEXTROAMPHETAMINE SACCHARATE, AMPHETAMINE ASPARTATE, DEXTROAMPHETAMINE SULFATE AND AMPHETAMINE SULFATE 5; 5; 5; 5 MG/1; MG/1; MG/1; MG/1
20 TABLET ORAL DAILY
Qty: 30 TABLET | Refills: 0 | Status: SHIPPED | OUTPATIENT
Start: 2024-11-08 | End: 2024-12-08

## 2024-12-06 DIAGNOSIS — R41.840 ATTENTION DEFICIT: ICD-10-CM

## 2024-12-06 RX ORDER — DEXTROAMPHETAMINE SACCHARATE, AMPHETAMINE ASPARTATE, DEXTROAMPHETAMINE SULFATE AND AMPHETAMINE SULFATE 5; 5; 5; 5 MG/1; MG/1; MG/1; MG/1
20 TABLET ORAL DAILY
Qty: 30 TABLET | Refills: 0 | Status: SHIPPED | OUTPATIENT
Start: 2024-12-06 | End: 2025-01-05

## 2025-01-06 DIAGNOSIS — R41.840 ATTENTION DEFICIT: ICD-10-CM

## 2025-01-06 RX ORDER — DEXTROAMPHETAMINE SACCHARATE, AMPHETAMINE ASPARTATE, DEXTROAMPHETAMINE SULFATE AND AMPHETAMINE SULFATE 5; 5; 5; 5 MG/1; MG/1; MG/1; MG/1
20 TABLET ORAL DAILY
Qty: 30 TABLET | Refills: 0 | Status: SHIPPED | OUTPATIENT
Start: 2025-01-06 | End: 2025-02-05

## 2025-02-03 DIAGNOSIS — R41.840 ATTENTION DEFICIT: ICD-10-CM

## 2025-02-04 RX ORDER — DEXTROAMPHETAMINE SACCHARATE, AMPHETAMINE ASPARTATE, DEXTROAMPHETAMINE SULFATE AND AMPHETAMINE SULFATE 5; 5; 5; 5 MG/1; MG/1; MG/1; MG/1
20 TABLET ORAL DAILY
Qty: 30 TABLET | Refills: 0 | Status: SHIPPED | OUTPATIENT
Start: 2025-02-04 | End: 2025-03-06

## 2025-03-06 DIAGNOSIS — R41.840 ATTENTION DEFICIT: ICD-10-CM

## 2025-03-10 DIAGNOSIS — R41.840 ATTENTION DEFICIT: ICD-10-CM

## 2025-03-10 RX ORDER — DEXTROAMPHETAMINE SACCHARATE, AMPHETAMINE ASPARTATE, DEXTROAMPHETAMINE SULFATE AND AMPHETAMINE SULFATE 5; 5; 5; 5 MG/1; MG/1; MG/1; MG/1
20 TABLET ORAL DAILY
Qty: 30 TABLET | Refills: 0 | Status: SHIPPED | OUTPATIENT
Start: 2025-03-10 | End: 2025-04-09

## 2025-03-11 RX ORDER — DEXTROAMPHETAMINE SACCHARATE, AMPHETAMINE ASPARTATE, DEXTROAMPHETAMINE SULFATE AND AMPHETAMINE SULFATE 5; 5; 5; 5 MG/1; MG/1; MG/1; MG/1
20 TABLET ORAL DAILY
Qty: 30 TABLET | Refills: 0 | OUTPATIENT
Start: 2025-03-11 | End: 2025-04-10

## 2025-03-20 NOTE — PROGRESS NOTES
lobe is asymmetric, atypical in pattern.    Plan:   1. I had a long discussion with patient and wiife. All questions were answered.  2.Continue Adderall 20 mg QD for attention   3. Continue Aricept 5 mg QD for now. Will increase depending on next visit  4. Advise to go back to ASA 81 for stroke prevention  5. Stress the need to quit smoking. Patient is reluctant  6. Advise to stop Topamax since no more headache and can slow his thinking  7. Depending on above, will consider Lexapro for possible depression      Return in about 6 months (around 9/21/2025).           Dewey Fish MD  Diplomate, American Board of Psychiatry and Neurology  Diplomate, Neuromuscular Medicine  Diplomate, American Board of Electrodiagnostic Medicine

## 2025-03-21 ENCOUNTER — OFFICE VISIT (OUTPATIENT)
Age: 70
End: 2025-03-21
Payer: MEDICARE

## 2025-03-21 VITALS
HEART RATE: 88 BPM | SYSTOLIC BLOOD PRESSURE: 120 MMHG | HEIGHT: 71 IN | TEMPERATURE: 95.7 F | BODY MASS INDEX: 27.89 KG/M2 | DIASTOLIC BLOOD PRESSURE: 70 MMHG | OXYGEN SATURATION: 98 %

## 2025-03-21 DIAGNOSIS — F01.A3 MILD VASCULAR DEMENTIA WITH MOOD DISTURBANCE (HCC): Primary | ICD-10-CM

## 2025-03-21 DIAGNOSIS — G45.9 TIA (TRANSIENT ISCHEMIC ATTACK): ICD-10-CM

## 2025-03-21 DIAGNOSIS — R41.840 ATTENTION DEFICIT: ICD-10-CM

## 2025-03-21 PROCEDURE — G8428 CUR MEDS NOT DOCUMENT: HCPCS | Performed by: PSYCHIATRY & NEUROLOGY

## 2025-03-21 PROCEDURE — 99215 OFFICE O/P EST HI 40 MIN: CPT | Performed by: PSYCHIATRY & NEUROLOGY

## 2025-03-21 PROCEDURE — 4004F PT TOBACCO SCREEN RCVD TLK: CPT | Performed by: PSYCHIATRY & NEUROLOGY

## 2025-03-21 PROCEDURE — 3017F COLORECTAL CA SCREEN DOC REV: CPT | Performed by: PSYCHIATRY & NEUROLOGY

## 2025-03-21 PROCEDURE — 1123F ACP DISCUSS/DSCN MKR DOCD: CPT | Performed by: PSYCHIATRY & NEUROLOGY

## 2025-03-21 PROCEDURE — G8419 CALC BMI OUT NRM PARAM NOF/U: HCPCS | Performed by: PSYCHIATRY & NEUROLOGY

## 2025-03-27 ENCOUNTER — TELEPHONE (OUTPATIENT)
Age: 70
End: 2025-03-27

## 2025-03-27 DIAGNOSIS — R41.840 ATTENTION DEFICIT: ICD-10-CM

## 2025-03-27 NOTE — TELEPHONE ENCOUNTER
Patient requesting a refill on:    Amphetamine-dextroamphetamine ( Adderall 20 mg tablets)    Please send this one time refill request to:    [facility]    [phone]    Patient is completely out of medication

## 2025-04-01 RX ORDER — DEXTROAMPHETAMINE SACCHARATE, AMPHETAMINE ASPARTATE, DEXTROAMPHETAMINE SULFATE AND AMPHETAMINE SULFATE 5; 5; 5; 5 MG/1; MG/1; MG/1; MG/1
20 TABLET ORAL DAILY
Qty: 30 TABLET | Refills: 0 | Status: SHIPPED | OUTPATIENT
Start: 2025-04-01 | End: 2025-05-01

## 2025-05-02 DIAGNOSIS — R41.840 ATTENTION DEFICIT: ICD-10-CM

## 2025-05-02 RX ORDER — DEXTROAMPHETAMINE SACCHARATE, AMPHETAMINE ASPARTATE, DEXTROAMPHETAMINE SULFATE AND AMPHETAMINE SULFATE 5; 5; 5; 5 MG/1; MG/1; MG/1; MG/1
20 TABLET ORAL DAILY
Qty: 30 TABLET | Refills: 0 | Status: SHIPPED | OUTPATIENT
Start: 2025-05-02 | End: 2025-06-01